# Patient Record
Sex: FEMALE | Race: ASIAN | NOT HISPANIC OR LATINO | Employment: FULL TIME | URBAN - METROPOLITAN AREA
[De-identification: names, ages, dates, MRNs, and addresses within clinical notes are randomized per-mention and may not be internally consistent; named-entity substitution may affect disease eponyms.]

---

## 2024-10-08 ENCOUNTER — APPOINTMENT (OUTPATIENT)
Dept: PRIMARY CARE | Facility: CLINIC | Age: 44
End: 2024-10-08
Payer: COMMERCIAL

## 2024-11-04 ENCOUNTER — CLINICAL SUPPORT (OUTPATIENT)
Dept: URGENT CARE | Age: 44
End: 2024-11-04
Payer: COMMERCIAL

## 2024-11-04 VITALS
WEIGHT: 180.12 LBS | TEMPERATURE: 98.1 F | RESPIRATION RATE: 16 BRPM | SYSTOLIC BLOOD PRESSURE: 120 MMHG | HEART RATE: 81 BPM | OXYGEN SATURATION: 97 % | DIASTOLIC BLOOD PRESSURE: 85 MMHG

## 2024-11-05 ENCOUNTER — OFFICE VISIT (OUTPATIENT)
Dept: URGENT CARE | Age: 44
End: 2024-11-05
Payer: COMMERCIAL

## 2024-11-05 VITALS
HEART RATE: 83 BPM | DIASTOLIC BLOOD PRESSURE: 82 MMHG | SYSTOLIC BLOOD PRESSURE: 119 MMHG | OXYGEN SATURATION: 97 % | TEMPERATURE: 98.4 F

## 2024-11-05 DIAGNOSIS — L02.93 CARBUNCULOSIS: Primary | ICD-10-CM

## 2024-11-05 PROCEDURE — 99203 OFFICE O/P NEW LOW 30 MIN: CPT | Performed by: PHYSICIAN ASSISTANT

## 2024-11-05 RX ORDER — MUPIROCIN CALCIUM 20 MG/G
CREAM TOPICAL 3 TIMES DAILY
Qty: 15 G | Refills: 0 | Status: SHIPPED | OUTPATIENT
Start: 2024-11-05 | End: 2024-11-15

## 2024-11-05 RX ORDER — CEPHALEXIN 500 MG/1
500 CAPSULE ORAL 3 TIMES DAILY
Qty: 21 CAPSULE | Refills: 0 | Status: SHIPPED | OUTPATIENT
Start: 2024-11-05 | End: 2024-11-12

## 2024-11-05 NOTE — PROGRESS NOTES
Subjective   Patient ID: Joaquin Farris is a 44 y.o. female. They present today with a chief complaint of MRSA (Returning from yesterday).    History of Present Illness  HPI  Pt presents with lesion on her mons pubis region. Started several weeks ago. Has been draining pus and blood. No spreading redness or fever.     Past Medical History  Allergies as of 11/05/2024 - Reviewed 11/04/2024   Allergen Reaction Noted    Lisinopril-hydrochlorothiazide GI Upset and Nausea/vomiting 04/28/2017       (Not in a hospital admission)             No past medical history on file.    No past surgical history on file.         Review of Systems  Review of Systems   Review of systems: A complete review of systems was done, and is as stated in the history of present illness, is otherwise negative or not pertinent to the complaint.       Objective    Vitals:    11/05/24 1738   BP: 119/82   Pulse: 83   Temp: 36.9 °C (98.4 °F)   TempSrc: Oral   SpO2: 97%     No LMP recorded.    Physical Exam  NAD. Well appearing    MMM   PERRLA   no icterus   TMs clear bilat   Oropharynx clear of exudate or tonsillar swelling/exudate   neck supple. PAVITHRA. No LAD   no resp distress. Lungs CTAB without w/r/r   RRR. No m/r/g   Abd; Soft. NTTP   NIETO x 4. MS 5/5   Skin; 2 cm crusted carbuncle/furuncle on the mons pubis region. No fluctuance or spreading erythema.   pulses 2+ throughout   neuro intact with normal sensation and motor   psych a&o x 3       Procedures    Point of Care Test & Imaging Results from this visit  No results found for this or any previous visit.    Assessment/Plan   Allergies, medications, history, and pertinent labs/EKGs/Imaging reviewed by Manjinder Ford PA-C.     Medical Decision Making  -cw with furunculosis/carbuncle without abscess or cellulitis  -topical and oral abx  -warm compresses  -fu pcp within 1 week if not improved     Orders and Diagnoses  Diagnoses and all orders for this visit:  Carbunculosis  -     cephalexin  (Keflex) 500 mg capsule; Take 1 capsule (500 mg) by mouth 3 times a day for 7 days.  -     mupirocin (Bactroban) 2 % cream; Apply topically 3 times a day for 10 days.

## 2024-11-05 NOTE — PROGRESS NOTES
11/05/24    Joaquin Farris  YOB: 1980     To Whom It May Concern:    Joaquin Farris was seen in my clinic on 11/5/2024 at 5:45 pm. Please excuse Joaquin for her absence from school on this day to make the appointment.    If you have any questions or concerns, please don't hesitate to call.    According to the [employer] guidelines, endocarditis prophylaxis at times of increased risks is not indicated.     [unfilled]    Sincerely,         [facility]        CC: [unfilled]

## 2024-11-05 NOTE — PROGRESS NOTES
11/04/24    Joaquin Farris  YOB: 1980     To Whom It May Concern:    Joaquin Farris was seen in my clinic on 11/4/2024 at 7:00 pm. Please excuse Joaquin for her absence from school on this day to make the appointment.    If you have any questions or concerns, please don't hesitate to call.    According to the [employer] guidelines, endocarditis prophylaxis at times of increased risks is not indicated.     [unfilled]    Sincerely,         [facility]        CC: [unfilled]

## 2024-11-11 ENCOUNTER — APPOINTMENT (OUTPATIENT)
Dept: PRIMARY CARE | Facility: CLINIC | Age: 44
End: 2024-11-11
Payer: COMMERCIAL

## 2024-11-11 VITALS
HEART RATE: 102 BPM | WEIGHT: 180 LBS | SYSTOLIC BLOOD PRESSURE: 122 MMHG | HEIGHT: 62 IN | BODY MASS INDEX: 33.13 KG/M2 | DIASTOLIC BLOOD PRESSURE: 80 MMHG

## 2024-11-11 DIAGNOSIS — R73.03 PRE-DIABETES: ICD-10-CM

## 2024-11-11 DIAGNOSIS — E66.9 OBESITY (BMI 30-39.9): ICD-10-CM

## 2024-11-11 DIAGNOSIS — Z12.31 SCREENING MAMMOGRAM FOR BREAST CANCER: ICD-10-CM

## 2024-11-11 DIAGNOSIS — E03.9 ACQUIRED HYPOTHYROIDISM: ICD-10-CM

## 2024-11-11 DIAGNOSIS — B35.1 ONYCHOMYCOSIS: ICD-10-CM

## 2024-11-11 DIAGNOSIS — Z12.11 ENCOUNTER FOR SCREENING FOR MALIGNANT NEOPLASM OF COLON: ICD-10-CM

## 2024-11-11 DIAGNOSIS — M54.12 CERVICAL RADICULOPATHY: ICD-10-CM

## 2024-11-11 DIAGNOSIS — Z00.00 ANNUAL PHYSICAL EXAM: Primary | ICD-10-CM

## 2024-11-11 DIAGNOSIS — I10 ESSENTIAL (PRIMARY) HYPERTENSION: ICD-10-CM

## 2024-11-11 DIAGNOSIS — J45.20 MILD INTERMITTENT ASTHMA WITHOUT COMPLICATION (HHS-HCC): ICD-10-CM

## 2024-11-11 PROCEDURE — 1036F TOBACCO NON-USER: CPT | Performed by: INTERNAL MEDICINE

## 2024-11-11 PROCEDURE — 99386 PREV VISIT NEW AGE 40-64: CPT | Performed by: INTERNAL MEDICINE

## 2024-11-11 PROCEDURE — 3079F DIAST BP 80-89 MM HG: CPT | Performed by: INTERNAL MEDICINE

## 2024-11-11 PROCEDURE — 3008F BODY MASS INDEX DOCD: CPT | Performed by: INTERNAL MEDICINE

## 2024-11-11 PROCEDURE — 3074F SYST BP LT 130 MM HG: CPT | Performed by: INTERNAL MEDICINE

## 2024-11-11 RX ORDER — LOSARTAN POTASSIUM AND HYDROCHLOROTHIAZIDE 12.5; 5 MG/1; MG/1
1 TABLET ORAL DAILY
Qty: 90 TABLET | Refills: 1 | Status: SHIPPED | OUTPATIENT
Start: 2024-11-11 | End: 2025-11-11

## 2024-11-11 RX ORDER — LEVOTHYROXINE SODIUM 150 UG/1
150 TABLET ORAL DAILY
COMMUNITY

## 2024-11-11 NOTE — PROGRESS NOTES
"Subjective   Patient ID: Joaquin Farris is a 44 y.o. female who presents for Annual Exam.    HPI   Patient is a 44-year-old  female who comes to establish primary care and she is due for an annual wellness exam/lab work and also request refills on meds that she has been getting from [place].  She is S/P hysterectomy in 1/2023 for endometriosis.  Patient has been advised to see gynecology regarding Pap/pelvic, mammogram will be ordered and due to no family history of colon cancer no personal GI issues, Cologuard will be ordered for screening purposes.  Patient declines the flu vaccine today.  No history of fever, chills, chest pain, shortness of breath, wheezing, cough, abdominal pain, dizziness, palpitations, syncope, nausea, vomiting, diarrhea, loss of weight, loss of appetite, dysuria, hematuria, headaches, weakness or numbness.  Patient complains of chronic discoloration of great toenails bilaterally.  Review of Systems  As per [facility].  Patient was recently evaluated in urgent care for folic lightest in the pubic area and she is currently on cephalexin.  She complains of left-sided neck pain radiating down left upper extremity.  Objective   /80 (BP Location: Right arm, Patient Position: Sitting, BP Cuff Size: Large adult)   Pulse 102   Ht 1.575 m (5' 2\")   Wt 81.6 kg (180 lb)   BMI 32.92 kg/m²     Physical Exam  General - well developed, well appearing, obese, middle-aged  female in no acute respiratory distress  Eyes - normal conjunctiva with no pallor or icterus, normal extraocular movements  ENT - normal external auditory canals and tympanic membranes, throat clear with no exudates  Neck - No JVD, thyromegaly or lymphadenopathy  Lungs - no respiratory distress and lungs clear to auscultation bilaterally with no rales or wheezes  Heart - normal S1, S2 with normal heart rate, rhythm and no murmurs   Breasts, pelvic and pap - per gyn  Abdomen -  soft, nontender with no masses or " organomegaly  Extremities - no cyanosis or pedal edema, slightly thickened and discolored great toenails noted bilaterally  Neuro - grossly normal neuro exam with no focal neuro deficits  Psych - normal mental status, mood and affect   Skin - no rashes or ulcers  MSK - normal gait with grossly normal ROM of major joints  Assessment/Plan     1.  Annual wellness exam-routine labs, mammogram and Cologuard will be ordered, patient advised to see gynecology regarding Pap/pelvic  2.  Hypothyroidism-TSH will be checked, patient is on levothyroxine  3.  Asthma-refill provided for Symbicort MDI  4.  Obesity with a BMI of over 30-diet and exercise discussed and encouraged, labs will be checked  5.  Prediabetes-hemoglobin A1c will be checked  6.  Screening for colon cancer-Cologuard ordered  7.  Screening for breast cancer-screening bilateral mammogram ordered  8.  Onychomycosis of great toenails-patient will be referred to podiatry  9.  Hypertension-refill provided for losartan/HCT  10.  Left-sided neck pain radiating down left upper extremity-suggestive of cervical radiculopathy, x-ray of the cervical spine ordered and PT referral placed  Follow-up in 6 months or sooner if needed.  This note was partially generated using the Dragon voice recognition system. There may be some incorrect words, spelling and punctuation errors that were not corrected prior to committing the note to the patient's medical record.

## 2024-11-23 ENCOUNTER — LAB (OUTPATIENT)
Dept: LAB | Facility: LAB | Age: 44
End: 2024-11-23
Payer: COMMERCIAL

## 2024-11-23 DIAGNOSIS — Z00.00 ANNUAL PHYSICAL EXAM: ICD-10-CM

## 2024-11-23 PROCEDURE — 85027 COMPLETE CBC AUTOMATED: CPT

## 2024-11-23 PROCEDURE — 36415 COLL VENOUS BLD VENIPUNCTURE: CPT

## 2024-11-23 PROCEDURE — 84443 ASSAY THYROID STIM HORMONE: CPT

## 2024-11-23 PROCEDURE — 83036 HEMOGLOBIN GLYCOSYLATED A1C: CPT

## 2024-11-23 PROCEDURE — 80053 COMPREHEN METABOLIC PANEL: CPT

## 2024-11-23 PROCEDURE — 81003 URINALYSIS AUTO W/O SCOPE: CPT

## 2024-11-23 PROCEDURE — 80061 LIPID PANEL: CPT

## 2024-11-24 DIAGNOSIS — R73.03 PRE-DIABETES: Primary | ICD-10-CM

## 2024-11-24 DIAGNOSIS — E78.00 HYPERCHOLESTEREMIA: ICD-10-CM

## 2024-11-24 LAB
ALBUMIN SERPL BCP-MCNC: 4.4 G/DL (ref 3.4–5)
ALP SERPL-CCNC: 101 U/L (ref 33–110)
ALT SERPL W P-5'-P-CCNC: 12 U/L (ref 7–45)
ANION GAP SERPL CALC-SCNC: 14 MMOL/L (ref 10–20)
APPEARANCE UR: CLEAR
AST SERPL W P-5'-P-CCNC: 13 U/L (ref 9–39)
BILIRUB SERPL-MCNC: 0.4 MG/DL (ref 0–1.2)
BILIRUB UR STRIP.AUTO-MCNC: NEGATIVE MG/DL
BUN SERPL-MCNC: 8 MG/DL (ref 6–23)
CALCIUM SERPL-MCNC: 9.4 MG/DL (ref 8.6–10.6)
CHLORIDE SERPL-SCNC: 100 MMOL/L (ref 98–107)
CHOLEST SERPL-MCNC: 240 MG/DL (ref 0–199)
CHOLESTEROL/HDL RATIO: 5.8
CO2 SERPL-SCNC: 26 MMOL/L (ref 21–32)
COLOR UR: COLORLESS
CREAT SERPL-MCNC: 0.78 MG/DL (ref 0.5–1.05)
EGFRCR SERPLBLD CKD-EPI 2021: >90 ML/MIN/1.73M*2
ERYTHROCYTE [DISTWIDTH] IN BLOOD BY AUTOMATED COUNT: 17.2 % (ref 11.5–14.5)
EST. AVERAGE GLUCOSE BLD GHB EST-MCNC: 134 MG/DL
GLUCOSE SERPL-MCNC: 94 MG/DL (ref 74–99)
GLUCOSE UR STRIP.AUTO-MCNC: NORMAL MG/DL
HBA1C MFR BLD: 6.3 %
HCT VFR BLD AUTO: 38.6 % (ref 36–46)
HDLC SERPL-MCNC: 41.6 MG/DL
HGB BLD-MCNC: 11.4 G/DL (ref 12–16)
KETONES UR STRIP.AUTO-MCNC: NEGATIVE MG/DL
LDLC SERPL CALC-MCNC: 162 MG/DL
LEUKOCYTE ESTERASE UR QL STRIP.AUTO: NEGATIVE
MCH RBC QN AUTO: 22.5 PG (ref 26–34)
MCHC RBC AUTO-ENTMCNC: 29.5 G/DL (ref 32–36)
MCV RBC AUTO: 76 FL (ref 80–100)
NITRITE UR QL STRIP.AUTO: NEGATIVE
NON HDL CHOLESTEROL: 198 MG/DL (ref 0–149)
NRBC BLD-RTO: 0 /100 WBCS (ref 0–0)
PH UR STRIP.AUTO: 6.5 [PH]
PLATELET # BLD AUTO: 432 X10*3/UL (ref 150–450)
POTASSIUM SERPL-SCNC: 4.2 MMOL/L (ref 3.5–5.3)
PROT SERPL-MCNC: 8.6 G/DL (ref 6.4–8.2)
PROT UR STRIP.AUTO-MCNC: NEGATIVE MG/DL
RBC # BLD AUTO: 5.06 X10*6/UL (ref 4–5.2)
RBC # UR STRIP.AUTO: NEGATIVE /UL
SODIUM SERPL-SCNC: 136 MMOL/L (ref 136–145)
SP GR UR STRIP.AUTO: 1
TRIGL SERPL-MCNC: 181 MG/DL (ref 0–149)
TSH SERPL-ACNC: 3.6 MIU/L (ref 0.44–3.98)
UROBILINOGEN UR STRIP.AUTO-MCNC: NORMAL MG/DL
VLDL: 36 MG/DL (ref 0–40)
WBC # BLD AUTO: 10.5 X10*3/UL (ref 4.4–11.3)

## 2024-11-25 ENCOUNTER — APPOINTMENT (OUTPATIENT)
Dept: PRIMARY CARE | Facility: CLINIC | Age: 44
End: 2024-11-25
Payer: COMMERCIAL

## 2024-12-05 ENCOUNTER — APPOINTMENT (OUTPATIENT)
Dept: RADIOLOGY | Facility: CLINIC | Age: 44
End: 2024-12-05
Payer: COMMERCIAL

## 2024-12-14 LAB — NONINV COLON CA DNA+OCC BLD SCRN STL QL: NEGATIVE

## 2024-12-23 ENCOUNTER — APPOINTMENT (OUTPATIENT)
Dept: RADIOLOGY | Facility: CLINIC | Age: 44
End: 2024-12-23
Payer: COMMERCIAL

## 2024-12-24 ENCOUNTER — APPOINTMENT (OUTPATIENT)
Dept: RADIOLOGY | Facility: CLINIC | Age: 44
End: 2024-12-24
Payer: COMMERCIAL

## 2024-12-27 ENCOUNTER — APPOINTMENT (OUTPATIENT)
Dept: PHYSICAL THERAPY | Facility: CLINIC | Age: 44
End: 2024-12-27
Payer: COMMERCIAL

## 2024-12-31 ENCOUNTER — HOSPITAL ENCOUNTER (OUTPATIENT)
Dept: RADIOLOGY | Facility: CLINIC | Age: 44
Discharge: HOME | End: 2024-12-31
Payer: COMMERCIAL

## 2024-12-31 ENCOUNTER — EVALUATION (OUTPATIENT)
Dept: PHYSICAL THERAPY | Facility: CLINIC | Age: 44
End: 2024-12-31
Payer: COMMERCIAL

## 2024-12-31 DIAGNOSIS — M54.12 CERVICAL RADICULOPATHY: ICD-10-CM

## 2024-12-31 DIAGNOSIS — Z12.31 SCREENING MAMMOGRAM FOR BREAST CANCER: ICD-10-CM

## 2024-12-31 PROCEDURE — 97110 THERAPEUTIC EXERCISES: CPT | Mod: GP

## 2024-12-31 PROCEDURE — 72050 X-RAY EXAM NECK SPINE 4/5VWS: CPT | Performed by: RADIOLOGY

## 2024-12-31 PROCEDURE — 77067 SCR MAMMO BI INCL CAD: CPT

## 2024-12-31 PROCEDURE — 77067 SCR MAMMO BI INCL CAD: CPT | Performed by: RADIOLOGY

## 2024-12-31 PROCEDURE — 72050 X-RAY EXAM NECK SPINE 4/5VWS: CPT

## 2024-12-31 PROCEDURE — 77063 BREAST TOMOSYNTHESIS BI: CPT | Performed by: RADIOLOGY

## 2024-12-31 PROCEDURE — 97161 PT EVAL LOW COMPLEX 20 MIN: CPT | Mod: GP

## 2024-12-31 ASSESSMENT — ENCOUNTER SYMPTOMS
DEPRESSION: 0
OCCASIONAL FEELINGS OF UNSTEADINESS: 0
LOSS OF SENSATION IN FEET: 0

## 2024-12-31 NOTE — PROGRESS NOTES
Physical Therapy Evaluation    Subjective:  Patient Name: Joaquin Farris  MRN: [id number]  Today's Date: 12/31/2024  Visit: 1  Referred by: Erika Andrew  Time Calculation  Start Time: 1200  Stop Time: 1245  Time Calculation (min): 45 min  Diagnosis: cervical radiculopathy L  Mechanism of Injury: insidious.  Has been having neck and L UE pain x 2-3 years.  Pain is intermittent, not daily  Sometimes sx are accompanied by dizziness, nausea  Location: back of neck, sometimes into L pec, down L UT, posterior upper arm to elbow  Pain Rating: up to 7-8/10  Increased pain: sitting at work.  Decreased pain: moving arm out to side, looking up, self massage, topicals  R handed    Current Medical Management:  -cervical xray ordered  Precautions: none  Functional Limitations: none but difficulty tolerating long sitting duration  Prior Level of Function: indep with IADLs  Work Status: Works for [employer].  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, + 2 screens)  Living Environment: no issues  Social Support: , has twins  Personal Factors that may impact care: none    Objective:  TTP over C7/T1 spinous processes    Cervical AROM (R/L):  Flexion= WNL  Extension= WNL  Sidebend= 51/50  Rotation= 61/61 with pain on L when turning to L  Retraction= WNL    Shoulder AROM/PROM (R/L):  Flexion= WNL  Abduction= WNL  Elbow Flexion= WNL  Elbow Extension= WNL    Shoulder Strength (R/L):  Flexion= 4+/4+  Abduction= 4+/4+  Elbow Flexion=5/5  Elbow Extension= 5/5  Mid trap= 4-/4-  Low trap= 4+/4  Rhomboids= 4+/4+    NDI= 2% impairment (difficulty with reading)  Decreased pain with manual cervical traction  Negative cervical compression test bilat    Treatment Performed Today:  Instructed in and issued for HEP:  Cervial and scapular retractions, UT stretch, corner pec stretch, GTB rows  Began education on work set up, arm rest support for chair, getting up 1x/hour to stretch/move  Assessment:  45 yo F with chronic, intermittent  neck pain and L UE pain.  Presents with neck/upper back weakness, and difficulty tolerating prolonged sitting while at work.  Would benefit from PT to address deficits and improve functional tolerance.     . Low complexity due to patient's clinical presentation being stable and uncomplicated by any significant comorbidities that may affect rehab tolerance and progression.    Clinical presentation:  Stable and/or uncomplicated characteristics  Problem List:  Postural weakness, decreased sitting tolerance, UE/neck weakness  Level of Complexity:  low  Plan:  -Goals:   Active       PT Problem       Patient to report at least 50% improvement in frequency of sx       Start:  12/31/24    Expected End:  03/31/25            Increase UE strength to at least 4+/5       Start:  12/31/24    Expected End:  03/31/25            Patient to report no more than 4/10 pain at most       Start:  12/31/24    Expected End:  03/31/25            Assist patient in modification of work set up if needed.       Start:  12/31/24    Expected End:  03/31/25              -Frequency & Duration:   Follow up 1x every 2 weeks x 4-6 visits  -Rehab Potential:   good  Plan of care was developed with input and agreement of the patient.    Billing:  PT Evaluation Time Entry  PT Evaluation (Low) Time Entry: 30  PT Therapeutic Procedures Time Entry  Therapeutic Exercise Time Entry: 15

## 2025-01-30 ENCOUNTER — APPOINTMENT (OUTPATIENT)
Dept: PODIATRY | Facility: CLINIC | Age: 45
End: 2025-01-30
Payer: COMMERCIAL

## 2025-02-04 ENCOUNTER — APPOINTMENT (OUTPATIENT)
Dept: PHYSICAL THERAPY | Facility: CLINIC | Age: 45
End: 2025-02-04
Payer: COMMERCIAL

## 2025-03-28 ENCOUNTER — APPOINTMENT (OUTPATIENT)
Dept: PODIATRY | Facility: CLINIC | Age: 45
End: 2025-03-28
Payer: COMMERCIAL

## 2025-04-09 ENCOUNTER — DOCUMENTATION (OUTPATIENT)
Dept: PHYSICAL THERAPY | Facility: CLINIC | Age: 45
End: 2025-04-09
Payer: COMMERCIAL

## 2025-04-09 NOTE — PROGRESS NOTES
Physical Therapy    Discharge Summary    Name: Joaquin Farris  MRN: [id number]  : 1980  Date: 25    Discharge Summary: PT    Discharge Information: Date of discharge 25, Date of last visit 24, Date of evaluation 24, Number of attended visits 1, Referred by Kamran, and Referred for L cervical radiculopathy    Therapy Summary: attended initial eval only    Rehab Discharge Reason: Failed to schedule and/or keep follow-up appointment(s)

## 2025-05-09 ENCOUNTER — APPOINTMENT (OUTPATIENT)
Dept: PODIATRY | Facility: CLINIC | Age: 45
End: 2025-05-09
Payer: COMMERCIAL

## 2025-05-12 ENCOUNTER — APPOINTMENT (OUTPATIENT)
Dept: PRIMARY CARE | Facility: CLINIC | Age: 45
End: 2025-05-12
Payer: COMMERCIAL

## 2025-05-16 DIAGNOSIS — I10 ESSENTIAL (PRIMARY) HYPERTENSION: ICD-10-CM

## 2025-05-16 RX ORDER — LOSARTAN POTASSIUM AND HYDROCHLOROTHIAZIDE 12.5; 5 MG/1; MG/1
1 TABLET ORAL DAILY
Qty: 90 TABLET | Refills: 1 | Status: SHIPPED | OUTPATIENT
Start: 2025-05-16

## 2025-05-27 ENCOUNTER — APPOINTMENT (OUTPATIENT)
Dept: PRIMARY CARE | Facility: CLINIC | Age: 45
End: 2025-05-27
Payer: COMMERCIAL

## 2025-07-22 ENCOUNTER — APPOINTMENT (OUTPATIENT)
Dept: PRIMARY CARE | Facility: CLINIC | Age: 45
End: 2025-07-22
Payer: COMMERCIAL

## 2025-07-22 VITALS
BODY MASS INDEX: 33.13 KG/M2 | WEIGHT: 180 LBS | HEIGHT: 62 IN | DIASTOLIC BLOOD PRESSURE: 60 MMHG | SYSTOLIC BLOOD PRESSURE: 108 MMHG

## 2025-07-22 DIAGNOSIS — M54.2 NECK PAIN: ICD-10-CM

## 2025-07-22 DIAGNOSIS — E55.9 VITAMIN D DEFICIENCY: ICD-10-CM

## 2025-07-22 DIAGNOSIS — E03.9 ACQUIRED HYPOTHYROIDISM: Primary | ICD-10-CM

## 2025-07-22 DIAGNOSIS — D50.9 MICROCYTIC ANEMIA: ICD-10-CM

## 2025-07-22 DIAGNOSIS — I10 ESSENTIAL (PRIMARY) HYPERTENSION: ICD-10-CM

## 2025-07-22 DIAGNOSIS — D51.3 OTHER DIETARY VITAMIN B12 DEFICIENCY ANEMIA: ICD-10-CM

## 2025-07-22 DIAGNOSIS — D50.8 IRON DEFICIENCY ANEMIA SECONDARY TO INADEQUATE DIETARY IRON INTAKE: ICD-10-CM

## 2025-07-22 DIAGNOSIS — J45.20 MILD INTERMITTENT ASTHMA WITHOUT COMPLICATION (HHS-HCC): ICD-10-CM

## 2025-07-22 DIAGNOSIS — R73.03 PRE-DIABETES: ICD-10-CM

## 2025-07-22 DIAGNOSIS — E78.2 MIXED HYPERLIPIDEMIA: ICD-10-CM

## 2025-07-22 DIAGNOSIS — E66.9 OBESITY (BMI 30-39.9): ICD-10-CM

## 2025-07-22 PROCEDURE — 3008F BODY MASS INDEX DOCD: CPT | Performed by: INTERNAL MEDICINE

## 2025-07-22 PROCEDURE — 99214 OFFICE O/P EST MOD 30 MIN: CPT | Performed by: INTERNAL MEDICINE

## 2025-07-22 PROCEDURE — 3074F SYST BP LT 130 MM HG: CPT | Performed by: INTERNAL MEDICINE

## 2025-07-22 PROCEDURE — 1036F TOBACCO NON-USER: CPT | Performed by: INTERNAL MEDICINE

## 2025-07-22 PROCEDURE — 3078F DIAST BP <80 MM HG: CPT | Performed by: INTERNAL MEDICINE

## 2025-07-22 RX ORDER — LEVOTHYROXINE SODIUM 150 UG/1
150 TABLET ORAL DAILY
Qty: 90 TABLET | Refills: 1 | Status: SHIPPED | OUTPATIENT
Start: 2025-07-22 | End: 2026-07-22

## 2025-07-22 RX ORDER — TELMISARTAN AND HYDROCHLOROTHIAZIDE 12.5; 4 MG/1; MG/1
1 TABLET ORAL DAILY
COMMUNITY

## 2025-07-22 ASSESSMENT — PATIENT HEALTH QUESTIONNAIRE - PHQ9
1. LITTLE INTEREST OR PLEASURE IN DOING THINGS: NOT AT ALL
SUM OF ALL RESPONSES TO PHQ9 QUESTIONS 1 AND 2: 0
2. FEELING DOWN, DEPRESSED OR HOPELESS: NOT AT ALL

## 2025-07-22 NOTE — PROGRESS NOTES
"Subjective   Patient ID: Joaquin Farris is a 45 y.o. female who presents for Follow-up and Med Refill.    HPI   Nadira is a 45-year-old  female who comes today for a follow-up visit.  Cologuard done in December 2024 was negative and labs done in conjunction with annual wellness exam in November 2024 were reviewed and noted-TSH and CMP were essentially unremarkable, CBC revealed mild microcytic anemia with a hemoglobin of 11.4, hemoglobin A1c was up at 6.3 and lipid panel revealed an elevated cholesterol of 240 with an LDL of 162, HDL of 41 and triglycerides of 181.  Labs done in [place] the copies of which patient brings with her today were reviewed and discussed with patient in detail-HDL was on the low side, LDL was up, hemoglobin A1c was 6.2, hemoglobin/hematocrit were low along with low iron saturation as well as low B12 and vitamin D levels.  Patient has been driving to [employer] as she has gotten a new position-she has been experiencing left-sided neck pain lately.  Patient needs refills on Breztri inhaler.  She would like me to switch her levothyroxine to Synthroid.  Patient is recently started watching her diet and has apparently lost about 4 pounds.  She also consulted a cardiologist while in [place] who recommended switching losartan/HCT to telmisartan/HCT and patient has a 4-month supply which she has purchased from [place].  No history of fever, chills, chest pain, shortness of breath, cough, dizziness, palpitations, syncope, abdominal pain, nausea, vomiting, diarrhea, melena, rectal bleeding, unintentional loss of weight, loss of appetite or genitourinary symptoms.  Patient lives with her  and twin sons who are 9 years old.  Review of Systems  As per HPI.  Objective   /60 (BP Location: Right arm, Patient Position: Sitting, BP Cuff Size: Large adult)   Ht 1.575 m (5' 2\")   Wt 81.6 kg (180 lb)   BMI 32.92 kg/m²     Physical Exam  General - well developed, well appearing, obese, " middle-aged  female in no acute respiratory distress  Eyes - normal conjunctiva with no pallor or icterus, normal extraocular movements  Neck - No JVD, thyromegaly or lymphadenopathy  Lungs - no respiratory distress and lungs clear to auscultation bilaterally with no rales or wheezes  Heart - normal S1, S2 with normal heart rate, rhythm and no murmurs   Abdomen -  soft, nontender with no masses or organomegaly  Extremities - no cyanosis or pedal edema, slightly thickened and discolored great toenails noted bilaterally  Neuro - grossly normal neuro exam with no focal neuro deficits  Psych - normal mental status, mood and affect   Skin - no rashes or ulcers  MSK - normal gait with grossly normal ROM of major joints  Assessment/Plan       1.  Hypothyroidism-patient would like to get her TSH repeated, levothyroxine will be switched to Synthroid per patient's request  2.  Asthma-stable, refill provided for Brester inhaler  3.  Prediabetes-patient will watch her diet  4.  Hypertension-controlled, patient will be switching losartan HCT to telmisartan HCT which she has bought from [place]  5.  Obesity with a BMI of over 32-patient is aware of the need to watch her diet and exercise regularly  6.  Microcytic anemia-secondary to iron deficiency and patient has started taking iron supplement  7.  B12 deficiency-patient is started to take B12 supplement  8.  Vitamin D deficiency-patient has started taking a vitamin D supplement  9.  Hyperlipidemia-patient has declined drug therapy, she will watch her diet and would like to get her lipid panel repeated in 6 months  10.  Left-sided neck pain-patient will be referred to PT, suspect secondary to posture during work as well as driving long distances  Follow-up later this year for an annual wellness exam and lab work.  34 minutes spent rooming the patient, reviewing records prior to and during the appointment, eliciting history, examining patient, counseling, coordination of care  and in documentation.  This note was partially generated using the Dragon voice recognition system. There may be some incorrect words, spelling and punctuation errors that were not corrected prior to committing the note to the patient's medical record.  This note was partially generated using the Dragon voice recognition system. There may be some incorrect words, spelling and punctuation errors that were not corrected prior to committing the note to the patient's medical record.

## 2025-08-06 ENCOUNTER — APPOINTMENT (OUTPATIENT)
Dept: PODIATRY | Facility: CLINIC | Age: 45
End: 2025-08-06
Payer: COMMERCIAL

## 2025-09-12 ENCOUNTER — APPOINTMENT (OUTPATIENT)
Dept: PODIATRY | Facility: CLINIC | Age: 45
End: 2025-09-12
Payer: COMMERCIAL

## 2025-09-18 ENCOUNTER — APPOINTMENT (OUTPATIENT)
Dept: PODIATRY | Facility: CLINIC | Age: 45
End: 2025-09-18
Payer: COMMERCIAL